# Patient Record
Sex: FEMALE | Race: WHITE | Employment: OTHER | URBAN - METROPOLITAN AREA
[De-identification: names, ages, dates, MRNs, and addresses within clinical notes are randomized per-mention and may not be internally consistent; named-entity substitution may affect disease eponyms.]

---

## 2017-06-15 ENCOUNTER — HOSPITAL ENCOUNTER (OUTPATIENT)
Dept: GENERAL RADIOLOGY | Age: 72
Discharge: HOME | End: 2017-06-15

## 2017-06-15 LAB
A/G RATIO: 1 (ref 1.1–2.2)
ALBUMIN SERPL-MCNC: 4.1 G/DL (ref 3.4–5)
ALP BLD-CCNC: 85 U/L (ref 40–129)
ALT SERPL-CCNC: 14 U/L (ref 10–40)
ANION GAP SERPL CALCULATED.3IONS-SCNC: 19 MMOL/L (ref 3–16)
AST SERPL-CCNC: 19 U/L (ref 15–37)
BILIRUB SERPL-MCNC: 0.3 MG/DL (ref 0–1)
BILIRUBIN URINE: NEGATIVE
BLOOD, URINE: ABNORMAL
BUN BLDV-MCNC: 34 MG/DL (ref 7–20)
CALCIUM SERPL-MCNC: 9.8 MG/DL (ref 8.3–10.6)
CHLORIDE BLD-SCNC: 103 MMOL/L (ref 99–110)
CHOLESTEROL, TOTAL: 204 MG/DL (ref 0–199)
CLARITY: ABNORMAL
CO2: 19 MMOL/L (ref 21–32)
COLOR: YELLOW
CREAT SERPL-MCNC: 1.5 MG/DL (ref 0.6–1.2)
CREATININE URINE: 78.9 MG/DL (ref 28–259)
GFR AFRICAN AMERICAN: 41
GFR NON-AFRICAN AMERICAN: 34
GLOBULIN: 4.2 G/DL
GLUCOSE BLD-MCNC: 159 MG/DL (ref 70–99)
GLUCOSE URINE: NEGATIVE MG/DL
HDLC SERPL-MCNC: 51 MG/DL (ref 40–60)
KETONES, URINE: NEGATIVE MG/DL
LDL CHOLESTEROL CALCULATED: 104 MG/DL
LEUKOCYTE ESTERASE, URINE: ABNORMAL
MICROALBUMIN UR-MCNC: 123.4 MG/DL
MICROALBUMIN/CREAT UR-RTO: 1564 MG/G (ref 0–30)
MICROSCOPIC EXAMINATION: YES
NITRITE, URINE: NEGATIVE
PH UA: 6
POTASSIUM SERPL-SCNC: 4.5 MMOL/L (ref 3.5–5.1)
PROTEIN UA: 100 MG/DL
RBC UA: ABNORMAL /HPF (ref 0–2)
SODIUM BLD-SCNC: 141 MMOL/L (ref 136–145)
SPECIFIC GRAVITY UA: 1.02
TOTAL PROTEIN: 8.3 G/DL (ref 6.4–8.2)
TRIGL SERPL-MCNC: 243 MG/DL (ref 0–150)
URINE TYPE: ABNORMAL
UROBILINOGEN, URINE: 0.2 E.U./DL
VLDLC SERPL CALC-MCNC: 49 MG/DL
WBC UA: >100 /HPF (ref 0–5)

## 2017-06-16 LAB
ESTIMATED AVERAGE GLUCOSE: 188.6 MG/DL
HBA1C MFR BLD: 8.2 %
URINE CULTURE, ROUTINE: NORMAL

## 2017-11-16 ENCOUNTER — HOSPITAL ENCOUNTER (OUTPATIENT)
Dept: GENERAL RADIOLOGY | Age: 72
Discharge: HOME | End: 2017-11-16

## 2017-11-16 LAB
A/G RATIO: 1 (ref 1.1–2.2)
ALBUMIN SERPL-MCNC: 4 G/DL (ref 3.4–5)
ALP BLD-CCNC: 85 U/L (ref 40–129)
ALT SERPL-CCNC: 14 U/L (ref 10–40)
ANION GAP SERPL CALCULATED.3IONS-SCNC: 20 MMOL/L (ref 3–16)
AST SERPL-CCNC: 20 U/L (ref 15–37)
BILIRUB SERPL-MCNC: <0.2 MG/DL (ref 0–1)
BUN BLDV-MCNC: 29 MG/DL (ref 7–20)
CALCIUM SERPL-MCNC: 9.8 MG/DL (ref 8.3–10.6)
CHLORIDE BLD-SCNC: 97 MMOL/L (ref 99–110)
CHOLESTEROL, FASTING: 224 MG/DL (ref 0–199)
CO2: 22 MMOL/L (ref 21–32)
CREAT SERPL-MCNC: 1.4 MG/DL (ref 0.6–1.2)
CREATININE URINE: 42.3 MG/DL (ref 28–259)
GFR AFRICAN AMERICAN: 45
GFR NON-AFRICAN AMERICAN: 37
GLOBULIN: 3.9 G/DL
GLUCOSE FASTING: 382 MG/DL (ref 70–99)
HDLC SERPL-MCNC: 65 MG/DL (ref 40–60)
LDL CHOLESTEROL CALCULATED: 118 MG/DL
MICROALBUMIN UR-MCNC: 172.8 MG/DL
MICROALBUMIN/CREAT UR-RTO: 4085.1 MG/G (ref 0–30)
POTASSIUM SERPL-SCNC: 5.1 MMOL/L (ref 3.5–5.1)
SODIUM BLD-SCNC: 139 MMOL/L (ref 136–145)
TOTAL PROTEIN: 7.9 G/DL (ref 6.4–8.2)
TRIGLYCERIDE, FASTING: 203 MG/DL (ref 0–150)
VLDLC SERPL CALC-MCNC: 41 MG/DL

## 2017-11-17 LAB
ESTIMATED AVERAGE GLUCOSE: 205.9 MG/DL
HBA1C MFR BLD: 8.8 %

## 2018-07-18 ENCOUNTER — HOSPITAL ENCOUNTER (OUTPATIENT)
Dept: MAMMOGRAPHY | Age: 73
Discharge: HOME | End: 2018-07-18
Payer: MEDICARE

## 2018-07-18 ENCOUNTER — HOSPITAL ENCOUNTER (OUTPATIENT)
Age: 73
Discharge: HOME | End: 2018-07-18
Payer: MEDICARE

## 2018-07-18 DIAGNOSIS — Z12.31 ENCOUNTER FOR SCREENING MAMMOGRAM FOR BREAST CANCER: ICD-10-CM

## 2018-07-18 PROCEDURE — 77067 SCR MAMMO BI INCL CAD: CPT

## 2018-09-11 ENCOUNTER — HOSPITAL ENCOUNTER (OUTPATIENT)
Age: 73
Discharge: HOME | End: 2018-09-11
Payer: MEDICARE

## 2018-09-11 LAB
A/G RATIO: 0.8 (ref 1.1–2.2)
ALBUMIN SERPL-MCNC: 3.3 G/DL (ref 3.4–5)
ALP BLD-CCNC: 88 U/L (ref 40–129)
ALT SERPL-CCNC: 15 U/L (ref 10–40)
ANION GAP SERPL CALCULATED.3IONS-SCNC: 14 MMOL/L (ref 3–16)
AST SERPL-CCNC: 20 U/L (ref 15–37)
BASOPHILS ABSOLUTE: 0.2 K/UL (ref 0–0.2)
BASOPHILS RELATIVE PERCENT: 1.8 %
BILIRUB SERPL-MCNC: <0.2 MG/DL (ref 0–1)
BILIRUBIN URINE: NEGATIVE
BLOOD, URINE: ABNORMAL
BUN BLDV-MCNC: 24 MG/DL (ref 7–20)
CALCIUM SERPL-MCNC: 9.6 MG/DL (ref 8.3–10.6)
CHLORIDE BLD-SCNC: 106 MMOL/L (ref 99–110)
CLARITY: CLEAR
CO2: 24 MMOL/L (ref 21–32)
COLOR: YELLOW
CREAT SERPL-MCNC: 1.6 MG/DL (ref 0.6–1.2)
EOSINOPHILS ABSOLUTE: 0.2 K/UL (ref 0–0.6)
EOSINOPHILS RELATIVE PERCENT: 1.2 %
EPITHELIAL CELLS, UA: ABNORMAL /HPF
GFR AFRICAN AMERICAN: 38
GFR NON-AFRICAN AMERICAN: 32
GLOBULIN: 4 G/DL
GLUCOSE BLD-MCNC: 206 MG/DL (ref 70–99)
GLUCOSE URINE: 250 MG/DL
HCT VFR BLD CALC: 40.3 % (ref 36–48)
HEMOGLOBIN: 13.1 G/DL (ref 12–16)
KETONES, URINE: NEGATIVE MG/DL
LEUKOCYTE ESTERASE, URINE: NEGATIVE
LYMPHOCYTES ABSOLUTE: 4.4 K/UL (ref 1–5.1)
LYMPHOCYTES RELATIVE PERCENT: 33.7 %
MCH RBC QN AUTO: 30.4 PG (ref 26–34)
MCHC RBC AUTO-ENTMCNC: 32.4 G/DL (ref 31–36)
MCV RBC AUTO: 93.9 FL (ref 80–100)
MICROSCOPIC EXAMINATION: YES
MONOCYTES ABSOLUTE: 0.8 K/UL (ref 0–1.3)
MONOCYTES RELATIVE PERCENT: 6.5 %
NEUTROPHILS ABSOLUTE: 7.4 K/UL (ref 1.7–7.7)
NEUTROPHILS RELATIVE PERCENT: 56.8 %
NITRITE, URINE: NEGATIVE
PDW BLD-RTO: 14.1 % (ref 12.4–15.4)
PH UA: 6
PLATELET # BLD: 359 K/UL (ref 135–450)
PMV BLD AUTO: 8.7 FL (ref 5–10.5)
POTASSIUM SERPL-SCNC: 4.7 MMOL/L (ref 3.5–5.1)
PROTEIN UA: 100 MG/DL
RBC # BLD: 4.29 M/UL (ref 4–5.2)
RBC UA: ABNORMAL /HPF (ref 0–2)
SODIUM BLD-SCNC: 144 MMOL/L (ref 136–145)
SPECIFIC GRAVITY UA: 1.02
TOTAL PROTEIN: 7.3 G/DL (ref 6.4–8.2)
URINE TYPE: ABNORMAL
UROBILINOGEN, URINE: 0.2 E.U./DL
WBC # BLD: 13 K/UL (ref 4–11)
WBC UA: ABNORMAL /HPF (ref 0–5)

## 2018-09-11 PROCEDURE — 83036 HEMOGLOBIN GLYCOSYLATED A1C: CPT

## 2018-09-11 PROCEDURE — 36415 COLL VENOUS BLD VENIPUNCTURE: CPT

## 2018-09-11 PROCEDURE — 85025 COMPLETE CBC W/AUTO DIFF WBC: CPT

## 2018-09-11 PROCEDURE — 80053 COMPREHEN METABOLIC PANEL: CPT

## 2018-09-11 PROCEDURE — 87086 URINE CULTURE/COLONY COUNT: CPT

## 2018-09-11 PROCEDURE — 81001 URINALYSIS AUTO W/SCOPE: CPT

## 2018-09-12 LAB
ESTIMATED AVERAGE GLUCOSE: 214.5 MG/DL
HBA1C MFR BLD: 9.1 %
ORGANISM: ABNORMAL
URINE CULTURE, ROUTINE: ABNORMAL
URINE CULTURE, ROUTINE: ABNORMAL

## 2018-10-01 ENCOUNTER — HOSPITAL ENCOUNTER (OUTPATIENT)
Age: 73
Discharge: HOME | End: 2018-10-01
Payer: MEDICARE

## 2018-10-01 LAB
A/G RATIO: 0.9 (ref 1.1–2.2)
ALBUMIN SERPL-MCNC: 3.6 G/DL (ref 3.4–5)
ALP BLD-CCNC: 75 U/L (ref 40–129)
ALT SERPL-CCNC: 13 U/L (ref 10–40)
ANION GAP SERPL CALCULATED.3IONS-SCNC: 17 MMOL/L (ref 3–16)
AST SERPL-CCNC: 28 U/L (ref 15–37)
BASOPHILS ABSOLUTE: 0.1 K/UL (ref 0–0.2)
BASOPHILS RELATIVE PERCENT: 0.6 %
BILIRUB SERPL-MCNC: 0.3 MG/DL (ref 0–1)
BUN BLDV-MCNC: 31 MG/DL (ref 7–20)
CALCIUM SERPL-MCNC: 9.4 MG/DL (ref 8.3–10.6)
CHLORIDE BLD-SCNC: 105 MMOL/L (ref 99–110)
CO2: 21 MMOL/L (ref 21–32)
CREAT SERPL-MCNC: 1.6 MG/DL (ref 0.6–1.2)
EOSINOPHILS ABSOLUTE: 0.1 K/UL (ref 0–0.6)
EOSINOPHILS RELATIVE PERCENT: 1.1 %
GFR AFRICAN AMERICAN: 38
GFR NON-AFRICAN AMERICAN: 32
GLOBULIN: 3.8 G/DL
GLUCOSE BLD-MCNC: 188 MG/DL (ref 70–99)
HCT VFR BLD CALC: 41.1 % (ref 36–48)
HEMOGLOBIN: 13.1 G/DL (ref 12–16)
LYMPHOCYTES ABSOLUTE: 4.1 K/UL (ref 1–5.1)
LYMPHOCYTES RELATIVE PERCENT: 39 %
MCH RBC QN AUTO: 29.8 PG (ref 26–34)
MCHC RBC AUTO-ENTMCNC: 31.8 G/DL (ref 31–36)
MCV RBC AUTO: 93.9 FL (ref 80–100)
MONOCYTES ABSOLUTE: 0.6 K/UL (ref 0–1.3)
MONOCYTES RELATIVE PERCENT: 5.7 %
NEUTROPHILS ABSOLUTE: 5.7 K/UL (ref 1.7–7.7)
NEUTROPHILS RELATIVE PERCENT: 53.6 %
PDW BLD-RTO: 14.3 % (ref 12.4–15.4)
PLATELET # BLD: 388 K/UL (ref 135–450)
PMV BLD AUTO: 9.1 FL (ref 5–10.5)
POTASSIUM SERPL-SCNC: 5.6 MMOL/L (ref 3.5–5.1)
RBC # BLD: 4.38 M/UL (ref 4–5.2)
SODIUM BLD-SCNC: 143 MMOL/L (ref 136–145)
TOTAL PROTEIN: 7.4 G/DL (ref 6.4–8.2)
WBC # BLD: 10.6 K/UL (ref 4–11)

## 2018-10-01 PROCEDURE — 36415 COLL VENOUS BLD VENIPUNCTURE: CPT

## 2018-10-01 PROCEDURE — 80053 COMPREHEN METABOLIC PANEL: CPT

## 2018-10-01 PROCEDURE — 85025 COMPLETE CBC W/AUTO DIFF WBC: CPT

## 2018-10-08 ENCOUNTER — HOSPITAL ENCOUNTER (OUTPATIENT)
Age: 73
Discharge: HOME | End: 2018-10-08
Payer: MEDICARE

## 2018-10-08 LAB
ANION GAP SERPL CALCULATED.3IONS-SCNC: 16 MMOL/L (ref 3–16)
BUN BLDV-MCNC: 22 MG/DL (ref 7–20)
CALCIUM SERPL-MCNC: 9.4 MG/DL (ref 8.3–10.6)
CHLORIDE BLD-SCNC: 106 MMOL/L (ref 99–110)
CO2: 23 MMOL/L (ref 21–32)
CREAT SERPL-MCNC: 1.6 MG/DL (ref 0.6–1.2)
GFR AFRICAN AMERICAN: 38
GFR NON-AFRICAN AMERICAN: 32
GLUCOSE BLD-MCNC: 168 MG/DL (ref 70–99)
POTASSIUM SERPL-SCNC: 4.9 MMOL/L (ref 3.5–5.1)
SODIUM BLD-SCNC: 145 MMOL/L (ref 136–145)

## 2018-10-08 PROCEDURE — 80048 BASIC METABOLIC PNL TOTAL CA: CPT

## 2018-10-08 PROCEDURE — 36415 COLL VENOUS BLD VENIPUNCTURE: CPT

## 2018-10-19 ENCOUNTER — HOSPITAL ENCOUNTER (OUTPATIENT)
Age: 73
Discharge: HOME | End: 2018-10-19
Payer: MEDICARE

## 2018-10-19 LAB
ANION GAP SERPL CALCULATED.3IONS-SCNC: 17 MMOL/L (ref 3–16)
BASOPHILS ABSOLUTE: 0.1 K/UL (ref 0–0.2)
BASOPHILS RELATIVE PERCENT: 0.7 %
BUN BLDV-MCNC: 29 MG/DL (ref 7–20)
CALCIUM SERPL-MCNC: 9.1 MG/DL (ref 8.3–10.6)
CHLORIDE BLD-SCNC: 105 MMOL/L (ref 99–110)
CO2: 21 MMOL/L (ref 21–32)
CREAT SERPL-MCNC: 1.6 MG/DL (ref 0.6–1.2)
EOSINOPHILS ABSOLUTE: 0.2 K/UL (ref 0–0.6)
EOSINOPHILS RELATIVE PERCENT: 2 %
GFR AFRICAN AMERICAN: 38
GFR NON-AFRICAN AMERICAN: 32
GLUCOSE BLD-MCNC: 195 MG/DL (ref 70–99)
HCT VFR BLD CALC: 40.6 % (ref 36–48)
HEMOGLOBIN: 13.1 G/DL (ref 12–16)
LYMPHOCYTES ABSOLUTE: 4.1 K/UL (ref 1–5.1)
LYMPHOCYTES RELATIVE PERCENT: 40.9 %
MCH RBC QN AUTO: 29.7 PG (ref 26–34)
MCHC RBC AUTO-ENTMCNC: 32.3 G/DL (ref 31–36)
MCV RBC AUTO: 92 FL (ref 80–100)
MONOCYTES ABSOLUTE: 0.8 K/UL (ref 0–1.3)
MONOCYTES RELATIVE PERCENT: 7.8 %
NEUTROPHILS ABSOLUTE: 4.8 K/UL (ref 1.7–7.7)
NEUTROPHILS RELATIVE PERCENT: 48.6 %
PDW BLD-RTO: 13.7 % (ref 12.4–15.4)
PLATELET # BLD: 402 K/UL (ref 135–450)
PMV BLD AUTO: 9 FL (ref 5–10.5)
POTASSIUM SERPL-SCNC: 4.5 MMOL/L (ref 3.5–5.1)
RBC # BLD: 4.41 M/UL (ref 4–5.2)
SODIUM BLD-SCNC: 143 MMOL/L (ref 136–145)
WBC # BLD: 9.9 K/UL (ref 4–11)

## 2018-10-19 PROCEDURE — 36415 COLL VENOUS BLD VENIPUNCTURE: CPT

## 2018-10-19 PROCEDURE — 85025 COMPLETE CBC W/AUTO DIFF WBC: CPT

## 2018-10-19 PROCEDURE — 83036 HEMOGLOBIN GLYCOSYLATED A1C: CPT

## 2018-10-19 PROCEDURE — 80048 BASIC METABOLIC PNL TOTAL CA: CPT

## 2018-10-20 LAB
ESTIMATED AVERAGE GLUCOSE: 205.9 MG/DL
HBA1C MFR BLD: 8.8 %

## 2018-11-05 ENCOUNTER — HOSPITAL ENCOUNTER (OUTPATIENT)
Age: 73
Discharge: HOME | End: 2018-11-05
Payer: MEDICARE

## 2018-11-05 LAB
ANION GAP SERPL CALCULATED.3IONS-SCNC: 16 MMOL/L (ref 3–16)
BUN BLDV-MCNC: 22 MG/DL (ref 7–20)
CALCIUM SERPL-MCNC: 9.1 MG/DL (ref 8.3–10.6)
CHLORIDE BLD-SCNC: 105 MMOL/L (ref 99–110)
CO2: 23 MMOL/L (ref 21–32)
CREAT SERPL-MCNC: 1.7 MG/DL (ref 0.6–1.2)
GFR AFRICAN AMERICAN: 36
GFR NON-AFRICAN AMERICAN: 29
GLUCOSE BLD-MCNC: 182 MG/DL (ref 70–99)
POTASSIUM SERPL-SCNC: 4.1 MMOL/L (ref 3.5–5.1)
SODIUM BLD-SCNC: 144 MMOL/L (ref 136–145)

## 2018-11-05 PROCEDURE — 36415 COLL VENOUS BLD VENIPUNCTURE: CPT

## 2018-11-05 PROCEDURE — 80048 BASIC METABOLIC PNL TOTAL CA: CPT

## 2019-02-28 ENCOUNTER — HOSPITAL ENCOUNTER (OUTPATIENT)
Age: 74
Discharge: HOME | End: 2019-02-28
Payer: MEDICARE

## 2019-02-28 LAB
A/G RATIO: 1 (ref 1.1–2.2)
ALBUMIN SERPL-MCNC: 3.4 G/DL (ref 3.4–5)
ALP BLD-CCNC: 74 U/L (ref 40–129)
ALT SERPL-CCNC: 15 U/L (ref 10–40)
ANION GAP SERPL CALCULATED.3IONS-SCNC: 16 MMOL/L (ref 3–16)
AST SERPL-CCNC: 22 U/L (ref 15–37)
BILIRUB SERPL-MCNC: <0.2 MG/DL (ref 0–1)
BUN BLDV-MCNC: 25 MG/DL (ref 7–20)
CALCIUM SERPL-MCNC: 8.9 MG/DL (ref 8.3–10.6)
CHLORIDE BLD-SCNC: 106 MMOL/L (ref 99–110)
CHOLESTEROL, FASTING: 250 MG/DL (ref 0–199)
CO2: 21 MMOL/L (ref 21–32)
CREAT SERPL-MCNC: 1.8 MG/DL (ref 0.6–1.2)
GFR AFRICAN AMERICAN: 33
GFR NON-AFRICAN AMERICAN: 28
GLOBULIN: 3.4 G/DL
GLUCOSE FASTING: 148 MG/DL (ref 70–99)
HDLC SERPL-MCNC: 44 MG/DL (ref 40–60)
LDL CHOLESTEROL CALCULATED: ABNORMAL MG/DL
LDL CHOLESTEROL DIRECT: 182 MG/DL
POTASSIUM SERPL-SCNC: 4.6 MMOL/L (ref 3.5–5.1)
SODIUM BLD-SCNC: 143 MMOL/L (ref 136–145)
TOTAL PROTEIN: 6.8 G/DL (ref 6.4–8.2)
TRIGLYCERIDE, FASTING: 312 MG/DL (ref 0–150)
VLDLC SERPL CALC-MCNC: ABNORMAL MG/DL

## 2019-02-28 PROCEDURE — 36415 COLL VENOUS BLD VENIPUNCTURE: CPT

## 2019-02-28 PROCEDURE — 80053 COMPREHEN METABOLIC PANEL: CPT

## 2019-02-28 PROCEDURE — 83036 HEMOGLOBIN GLYCOSYLATED A1C: CPT

## 2019-02-28 PROCEDURE — 80061 LIPID PANEL: CPT

## 2019-03-01 LAB
ESTIMATED AVERAGE GLUCOSE: 188.6 MG/DL
HBA1C MFR BLD: 8.2 %

## 2019-03-06 ENCOUNTER — HOSPITAL ENCOUNTER (OUTPATIENT)
Age: 74
Discharge: HOME | End: 2019-03-06
Payer: MEDICARE

## 2019-03-06 LAB
BACTERIA: ABNORMAL /HPF
BILIRUBIN URINE: NEGATIVE
BLOOD, URINE: ABNORMAL
CASTS: ABNORMAL /LPF
CLARITY: ABNORMAL
COLOR: YELLOW
EPITHELIAL CELLS, UA: ABNORMAL /HPF
GLUCOSE URINE: 100 MG/DL
KETONES, URINE: NEGATIVE MG/DL
LEUKOCYTE ESTERASE, URINE: NEGATIVE
MICROSCOPIC EXAMINATION: YES
NITRITE, URINE: NEGATIVE
PH UA: 6 (ref 5–8)
PROTEIN UA: 100 MG/DL
RBC UA: ABNORMAL /HPF (ref 0–2)
SPECIFIC GRAVITY UA: 1.02 (ref 1–1.03)
URINE TYPE: ABNORMAL
UROBILINOGEN, URINE: 0.2 E.U./DL
WBC UA: ABNORMAL /HPF (ref 0–5)

## 2019-03-06 PROCEDURE — 81001 URINALYSIS AUTO W/SCOPE: CPT

## 2019-03-06 PROCEDURE — 87086 URINE CULTURE/COLONY COUNT: CPT

## 2019-03-07 LAB — URINE CULTURE, ROUTINE: NORMAL

## 2019-04-22 ENCOUNTER — HOSPITAL ENCOUNTER (OUTPATIENT)
Age: 74
Discharge: HOME | End: 2019-04-22
Payer: MEDICARE

## 2019-04-22 LAB
A/G RATIO: 0.9 (ref 1.1–2.2)
ALBUMIN SERPL-MCNC: 3.3 G/DL (ref 3.4–5)
ALP BLD-CCNC: 63 U/L (ref 40–129)
ALT SERPL-CCNC: 12 U/L (ref 10–40)
ANION GAP SERPL CALCULATED.3IONS-SCNC: 14 MMOL/L (ref 3–16)
AST SERPL-CCNC: 21 U/L (ref 15–37)
BACTERIA: ABNORMAL /HPF
BASOPHILS ABSOLUTE: 0.1 K/UL (ref 0–0.2)
BASOPHILS RELATIVE PERCENT: 0.9 %
BILIRUB SERPL-MCNC: <0.2 MG/DL (ref 0–1)
BILIRUBIN URINE: NEGATIVE
BLOOD, URINE: NEGATIVE
BUN BLDV-MCNC: 24 MG/DL (ref 7–20)
CALCIUM SERPL-MCNC: 9.3 MG/DL (ref 8.3–10.6)
CHLORIDE BLD-SCNC: 106 MMOL/L (ref 99–110)
CHOLESTEROL, FASTING: 132 MG/DL (ref 0–199)
CLARITY: ABNORMAL
CO2: 24 MMOL/L (ref 21–32)
COLOR: YELLOW
CREAT SERPL-MCNC: 1.8 MG/DL (ref 0.6–1.2)
EOSINOPHILS ABSOLUTE: 0.2 K/UL (ref 0–0.6)
EOSINOPHILS RELATIVE PERCENT: 1.8 %
EPITHELIAL CELLS, UA: ABNORMAL /HPF
GFR AFRICAN AMERICAN: 33
GFR NON-AFRICAN AMERICAN: 28
GLOBULIN: 3.8 G/DL
GLUCOSE FASTING: 152 MG/DL (ref 70–99)
GLUCOSE URINE: 100 MG/DL
HCT VFR BLD CALC: 35.8 % (ref 36–48)
HDLC SERPL-MCNC: 46 MG/DL (ref 40–60)
HEMOGLOBIN: 11.6 G/DL (ref 12–16)
KETONES, URINE: NEGATIVE MG/DL
LDL CHOLESTEROL CALCULATED: 47 MG/DL
LEUKOCYTE ESTERASE, URINE: NEGATIVE
LYMPHOCYTES ABSOLUTE: 4.7 K/UL (ref 1–5.1)
LYMPHOCYTES RELATIVE PERCENT: 37.9 %
MCH RBC QN AUTO: 29.3 PG (ref 26–34)
MCHC RBC AUTO-ENTMCNC: 32.5 G/DL (ref 31–36)
MCV RBC AUTO: 90.1 FL (ref 80–100)
MICROSCOPIC EXAMINATION: YES
MONOCYTES ABSOLUTE: 1 K/UL (ref 0–1.3)
MONOCYTES RELATIVE PERCENT: 8.1 %
NEUTROPHILS ABSOLUTE: 6.4 K/UL (ref 1.7–7.7)
NEUTROPHILS RELATIVE PERCENT: 51.3 %
NITRITE, URINE: NEGATIVE
PDW BLD-RTO: 15.2 % (ref 12.4–15.4)
PH UA: 6 (ref 5–8)
PLATELET # BLD: 322 K/UL (ref 135–450)
PMV BLD AUTO: 9 FL (ref 5–10.5)
POTASSIUM SERPL-SCNC: 4.7 MMOL/L (ref 3.5–5.1)
PRO-BNP: 915 PG/ML (ref 0–124)
PROTEIN UA: >=300 MG/DL
RBC # BLD: 3.97 M/UL (ref 4–5.2)
RBC UA: ABNORMAL /HPF (ref 0–2)
SODIUM BLD-SCNC: 144 MMOL/L (ref 136–145)
SPECIFIC GRAVITY UA: 1.02 (ref 1–1.03)
T3 FREE: 2.6 PG/ML (ref 2.3–4.2)
THYROID PEROXIDASE (TPO) ABS: 20 IU/ML
TOTAL PROTEIN: 7.1 G/DL (ref 6.4–8.2)
TRIGLYCERIDE, FASTING: 196 MG/DL (ref 0–150)
TSH REFLEX: 3.21 UIU/ML (ref 0.27–4.2)
URINE TYPE: ABNORMAL
UROBILINOGEN, URINE: 0.2 E.U./DL
VLDLC SERPL CALC-MCNC: 39 MG/DL
WBC # BLD: 12.4 K/UL (ref 4–11)
WBC UA: ABNORMAL /HPF (ref 0–5)

## 2019-04-22 PROCEDURE — 84443 ASSAY THYROID STIM HORMONE: CPT

## 2019-04-22 PROCEDURE — 83036 HEMOGLOBIN GLYCOSYLATED A1C: CPT

## 2019-04-22 PROCEDURE — 83880 ASSAY OF NATRIURETIC PEPTIDE: CPT

## 2019-04-22 PROCEDURE — 86376 MICROSOMAL ANTIBODY EACH: CPT

## 2019-04-22 PROCEDURE — 87086 URINE CULTURE/COLONY COUNT: CPT

## 2019-04-22 PROCEDURE — 82570 ASSAY OF URINE CREATININE: CPT

## 2019-04-22 PROCEDURE — 80061 LIPID PANEL: CPT

## 2019-04-22 PROCEDURE — 82043 UR ALBUMIN QUANTITATIVE: CPT

## 2019-04-22 PROCEDURE — 80053 COMPREHEN METABOLIC PANEL: CPT

## 2019-04-22 PROCEDURE — 81001 URINALYSIS AUTO W/SCOPE: CPT

## 2019-04-22 PROCEDURE — 36415 COLL VENOUS BLD VENIPUNCTURE: CPT

## 2019-04-22 PROCEDURE — 84481 FREE ASSAY (FT-3): CPT

## 2019-04-22 PROCEDURE — 85025 COMPLETE CBC W/AUTO DIFF WBC: CPT

## 2019-04-23 LAB
CREATININE URINE: 88.4 MG/DL (ref 28–259)
ESTIMATED AVERAGE GLUCOSE: 205.9 MG/DL
HBA1C MFR BLD: 8.8 %
MICROALBUMIN UR-MCNC: 337.3 MG/DL
MICROALBUMIN/CREAT UR-RTO: 3815.6 MG/G (ref 0–30)

## 2019-04-24 LAB — URINE CULTURE, ROUTINE: NORMAL

## 2019-06-05 ENCOUNTER — TELEPHONE (OUTPATIENT)
Dept: SURGERY | Age: 74
End: 2019-06-05

## 2019-06-07 NOTE — PROGRESS NOTES
[facility]) Surgical Oncology  [facility]    HPI: Dear Dr. Aishwarya Copeland, Thank you for referring Ms. Smith who is here today with her grand chilrtierney for management of adenocarcinoma of the liver. The mass was first noticed in September of 2018 when she was admitted to the hospital for a UTI. At that time, a mass on liver noted thought to be hematoma. Patient recently developed leg swellings and had new testing done. Repeat imaging in April of this year show's a slight increase in size. Mass measures 11.4 x 7.9 x 7 cm previously 10.2 x 7 x 7 cm. A biopsy of the mass confirmed adenocarcinoma. Denies any recent nausea, vomiting, abdominal pain. No blood in stool. Endorses diarrhea but states this is not new. Recent EGD/Colonoscopy. 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 Her Mother had uterine cancer in her [de-identified] and her grandmother had some type of abdominal cancer. Also in her [de-identified]. Past Medical History:   Diagnosis Date    Cancer ([address]     Chronic back pain     Chronic kidney disease     GERD (gastroesophageal reflux disease)     Headache     Hyperlipidemia     Hypertension     Irritable bowel syndrome     Neuropathy     Obesity     Osteoarthritis     Type 2 diabetes mellitus without complication (HCC)     Urinary incontinence      Past Surgical History:   Procedure Laterality Date    APPENDECTOMY      CHOLECYSTECTOMY      EYE SURGERY      HYSTERECTOMY, TOTAL ABDOMINAL      SKIN CANCER EXCISION      SPLENECTOMY      TONSILLECTOMY AND ADENOIDECTOMY      XR CALCANEUS HEEL 4 VWR COMPLETE       Social:   Social History     Tobacco Use    Smoking status: Former Smoker    Smokeless tobacco: Never Used   Substance Use Topics    Alcohol use: Yes    Drug use: Never     History reviewed. No pertinent family history.   Allergies: Oxycodone-acetaminophen  Current Outpatient Medications   Medication Sig Dispense Refill    amLODIPine-atorvastatatin (CADUET) 5-10 MG per tablet 5 mg      furosemide (LASIX) 40 MG tablet 20 mg      lisinopril (PRINIVIL;ZESTRIL) 2.5 MG tablet 2.5 mg      bisoprolol (ZEBETA) 5 MG tablet 5 mg      atorvastatin (LIPITOR) 40 MG tablet 40 mg      insulin lispro (HUMALOG) 100 UNIT/ML injection vial Inject 25 Units into the skin      TRESIBA FLEXTOUCH 200 UNIT/ML SOPN       Multiple Vitamins-Minerals (COMPLETE WOMENS) TABS Take 1 tablet by mouth      vitamin D (CHOLECALCIFEROL) 1000 UNIT TABS tablet Take 1,000 Units by mouth       No current facility-administered medications for this visit. Review of Systems: See HPI  All other systems reviewed and are negative. Vitals:    06/11/19 1109 06/11/19 1129   BP: (!) 181/85 (!) 152/80   Pulse: 72    Temp: 98.6 °F (37 °C)    TempSrc: Oral    SpO2: 97%    Weight: 262 lb (118.8 kg)    Height: 5' 5\" (1.651 m)      Wt Readings from Last 3 Encounters:   06/11/19 262 lb (118.8 kg)     Body mass index is 43.6 kg/m². Physical Exam:   Constitutional: She is oriented to person, place, and time. She appears well-developed and well-nourished. No distress. HENT: Moist mucus membranes  Head: Normocephalic and atraumatic. Eyes: EOM are normal. Pupils are equal, round, and no icterus. Neck: Normal range of motion. Neck supple. No thyromegaly present. Cardiovascular: Normal rate and regular rhythm by peripheral pulses. Pulmonary/Chest: No respiratory distress. Bilateral symmetrical chest rise. Abdominal: Soft. She exhibits no distension and no mass. There is no hepatosplenomegaly. No tenderness. Incisional scar present. Extremities: She has bilateral lower extremity edema. Lymphadenopathy: She has no bilateral cervical adenopathy. Neurological: Grossly intact motor and sensory exam.   Skin: Skin is warm and dry. Psychiatric: She has a normal mood and affect. Appropriate thought process and judgement capacity.     5/1/19:    AFP: <2.2  CEA: 1.37    Creatinine - 4/22 - 1.8    CT ABD/Pelvis

## 2019-06-11 ENCOUNTER — OFFICE VISIT (OUTPATIENT)
Dept: SURGERY | Age: 74
End: 2019-06-11
Payer: MEDICARE

## 2019-06-11 VITALS
BODY MASS INDEX: 43.65 KG/M2 | HEART RATE: 72 BPM | SYSTOLIC BLOOD PRESSURE: 152 MMHG | TEMPERATURE: 98.6 F | OXYGEN SATURATION: 97 % | WEIGHT: 262 LBS | HEIGHT: 65 IN | DIASTOLIC BLOOD PRESSURE: 80 MMHG

## 2019-06-11 DIAGNOSIS — C22.9 HEPATIC ADENOCARCINOMA (HCC): Primary | ICD-10-CM

## 2019-06-11 DIAGNOSIS — N18.9 CHRONIC RENAL FAILURE, UNSPECIFIED CKD STAGE: ICD-10-CM

## 2019-06-11 DIAGNOSIS — R60.0 BILATERAL LEG EDEMA: ICD-10-CM

## 2019-06-11 PROCEDURE — G8427 DOCREV CUR MEDS BY ELIG CLIN: HCPCS | Performed by: SURGERY

## 2019-06-11 PROCEDURE — 1123F ACP DISCUSS/DSCN MKR DOCD: CPT | Performed by: SURGERY

## 2019-06-11 PROCEDURE — 1090F PRES/ABSN URINE INCON ASSESS: CPT | Performed by: SURGERY

## 2019-06-11 PROCEDURE — 4040F PNEUMOC VAC/ADMIN/RCVD: CPT | Performed by: SURGERY

## 2019-06-11 PROCEDURE — 3017F COLORECTAL CA SCREEN DOC REV: CPT | Performed by: SURGERY

## 2019-06-11 PROCEDURE — G8400 PT W/DXA NO RESULTS DOC: HCPCS | Performed by: SURGERY

## 2019-06-11 PROCEDURE — 1036F TOBACCO NON-USER: CPT | Performed by: SURGERY

## 2019-06-11 PROCEDURE — 99205 OFFICE O/P NEW HI 60 MIN: CPT | Performed by: SURGERY

## 2019-06-11 PROCEDURE — G8417 CALC BMI ABV UP PARAM F/U: HCPCS | Performed by: SURGERY

## 2019-06-11 RX ORDER — AMLODIPINE BESYLATE AND ATORVASTATIN CALCIUM 5; 10 MG/1; MG/1
5 TABLET, FILM COATED ORAL
COMMUNITY
End: 2019-06-20 | Stop reason: ALTCHOICE

## 2019-06-11 RX ORDER — BISOPROLOL FUMARATE 5 MG/1
5 TABLET ORAL
COMMUNITY
End: 2019-06-21 | Stop reason: SDUPTHER

## 2019-06-11 RX ORDER — FUROSEMIDE 40 MG/1
20 TABLET ORAL
COMMUNITY
End: 2019-06-20

## 2019-06-11 RX ORDER — INSULIN DEGLUDEC 200 U/ML
INJECTION, SOLUTION SUBCUTANEOUS
COMMUNITY
Start: 2019-04-13

## 2019-06-11 RX ORDER — ATORVASTATIN CALCIUM 40 MG/1
40 TABLET, FILM COATED ORAL
COMMUNITY
End: 2019-06-21 | Stop reason: SDUPTHER

## 2019-06-11 RX ORDER — LISINOPRIL 2.5 MG/1
2.5 TABLET ORAL
COMMUNITY

## 2019-06-12 DIAGNOSIS — C22.9 HEPATIC ADENOCARCINOMA (HCC): Primary | ICD-10-CM

## 2019-06-14 DIAGNOSIS — C22.9 HEPATIC ADENOCARCINOMA (HCC): Primary | ICD-10-CM

## 2019-06-14 PROBLEM — N18.9 CHRONIC RENAL FAILURE: Status: ACTIVE | Noted: 2019-06-14

## 2019-06-17 ENCOUNTER — TELEPHONE (OUTPATIENT)
Dept: SURGERY | Age: 74
End: 2019-06-17

## 2019-06-17 DIAGNOSIS — R60.0 BILATERAL LEG EDEMA: ICD-10-CM

## 2019-06-17 DIAGNOSIS — C22.9 HEPATIC ADENOCARCINOMA (HCC): Primary | ICD-10-CM

## 2019-06-17 DIAGNOSIS — N18.9 CHRONIC RENAL FAILURE, UNSPECIFIED CKD STAGE: ICD-10-CM

## 2019-06-18 ENCOUNTER — HOSPITAL ENCOUNTER (OUTPATIENT)
Dept: INTERVENTIONAL RADIOLOGY/VASCULAR | Age: 74
Discharge: HOME | End: 2019-06-18
Payer: MEDICARE

## 2019-06-18 NOTE — H&P
Interventional Radiology consultation for Y90 Therasphere Radioembolization    Ms Micheline Mueller is a 68year old female who was seen in consultation for possible radioembolization of liver cancer. The procedure was explained in detail, including potential risks/complications and alternatives. All of the patients questions were answered and concerns addressed. At the end of the consultation, the patient signed informed consent to proceed with Y90 Therasphere radioembolization of the liver. Cancer History  Hepatic metastasis c/w adenocarcinoma, likely GI origin    Cross sectional Imaging  reviewed        There are no contraindications to the proposed workup and treatment based on the patients medical/surgical history and laboratory values. Consultations  Radiation Oncology : Dr. Copma Adams  Oncologic Surgery : Dr. Jj Marquez    The above consultants and I agree that Y90 Radioembolization of the patients hepatic tumor burden is the best treatment at this time. Anticipated number of treatments: 1, right lobe  Please note that this may change after additional imaging and angiography. Pre-authorization will be obtained at this time, following which the patient will be scheduled for a dedicated CT angiogram of the abdomen, followed by a planning angiogram with possible coil embolization of arterial branches which require exclusion from the treatment field. At the time of the angiogram, technetium injection into the hepatic artery will be performed for assessment oflung shunting in order to avoid pulmonary toxicity of at the time of treatment. When this workup has been completed, the patient will be scheduled for Y90 radioembolization.     Any questions or concerns regarding this treatment plan should be directed to :  [facility] Inter[facility] Nurse [phone]  Dr. Shakir Reyes

## 2019-06-21 ENCOUNTER — TELEPHONE (OUTPATIENT)
Dept: SURGERY | Age: 74
End: 2019-06-21

## 2019-06-21 ENCOUNTER — OFFICE VISIT (OUTPATIENT)
Dept: CARDIOLOGY CLINIC | Age: 74
End: 2019-06-21
Payer: MEDICARE

## 2019-06-21 VITALS
DIASTOLIC BLOOD PRESSURE: 80 MMHG | BODY MASS INDEX: 43.63 KG/M2 | HEART RATE: 67 BPM | WEIGHT: 262.2 LBS | SYSTOLIC BLOOD PRESSURE: 130 MMHG

## 2019-06-21 DIAGNOSIS — I50.32 CHRONIC HEART FAILURE WITH PRESERVED EJECTION FRACTION (HCC): Primary | ICD-10-CM

## 2019-06-21 DIAGNOSIS — I10 ESSENTIAL HYPERTENSION: ICD-10-CM

## 2019-06-21 DIAGNOSIS — Z01.818 PRE-OP EVALUATION: ICD-10-CM

## 2019-06-21 DIAGNOSIS — E78.2 MIXED HYPERLIPIDEMIA: ICD-10-CM

## 2019-06-21 PROCEDURE — 1090F PRES/ABSN URINE INCON ASSESS: CPT | Performed by: INTERNAL MEDICINE

## 2019-06-21 PROCEDURE — G8417 CALC BMI ABV UP PARAM F/U: HCPCS | Performed by: INTERNAL MEDICINE

## 2019-06-21 PROCEDURE — G8427 DOCREV CUR MEDS BY ELIG CLIN: HCPCS | Performed by: INTERNAL MEDICINE

## 2019-06-21 PROCEDURE — 93000 ELECTROCARDIOGRAM COMPLETE: CPT | Performed by: INTERNAL MEDICINE

## 2019-06-21 PROCEDURE — 99203 OFFICE O/P NEW LOW 30 MIN: CPT | Performed by: INTERNAL MEDICINE

## 2019-06-21 RX ORDER — ATORVASTATIN CALCIUM 40 MG/1
40 TABLET, FILM COATED ORAL DAILY
Qty: 90 TABLET | Refills: 3 | OUTPATIENT
Start: 2019-06-21

## 2019-06-21 RX ORDER — BISOPROLOL FUMARATE 5 MG/1
5 TABLET ORAL DAILY
Qty: 90 TABLET | Refills: 3 | COMMUNITY
Start: 2019-06-21

## 2019-06-21 NOTE — TELEPHONE ENCOUNTER
Patient's daughter verbalized an understanding that PET is scheduled for 6/25/19 at 08:30. Water only 5 hours before, hold Humalog for 12 hours and glucose level must be <200.

## 2019-06-21 NOTE — PROGRESS NOTES
Cc: preop evaluation for radioablation of liver cancer, HFpEF, HTN    HPI:     Mrs Gayatri Haque is a 69 yo woman with h/o HTN, HLP, overweight, HFpEF, CKD 3 (follows with Dr. Jojo Babb) and newly (09/2018) diagnosed liver cancer. She will be having radioembolization of liver mass by Dr. Kae Branch soon. Patient denies any prior cardiac history. She reports mild dyspnea with exertion and has bilateral leg edema. She has noticed some weight gain recently. She denies any other congestive symptoms. She was seen by Dr. Jojo Babb yesterday and her amlodipine 5 was changed to prazosin 2 mg for HTN in view of edema. Also she started taking lasix 80 daily from 40 daily yesterday. Echo 05/2019: normal except for moderate LVH with diastolic I    Xenia 74/4861: negative. ECG 6/21/19: NSR, poor R wave progression. Histories     Past Medical History:   has a past medical history of Cancer ([address] Chronic back pain, Chronic kidney disease, GERD (gastroesophageal reflux disease), Headache, Hyperlipidemia, Hypertension, Irritable bowel syndrome, Neuropathy, Obesity, Osteoarthritis, Type 2 diabetes mellitus without complication ([address] and Urinary incontinence. Surgical History:   has a past surgical history that includes Appendectomy; Cholecystectomy; Splenectomy; Tonsillectomy and adenoidectomy; Hysterectomy, total abdominal; xr calcaneus heel 4 vwr complete; eye surgery; and Skin cancer excision. Social History:   reports that she has quit smoking. She has never used smokeless tobacco. She reports that she drinks alcohol. She reports that she does not use drugs. Family History:  No evidence for sudden cardiac death or premature CAD      Medications:     Home medications were reviewed and are listed below    Prior to Admission medications    Medication Sig Start Date End Date Taking?  Authorizing Provider   prazosin (MINIPRESS) 2 MG capsule Take 1 capsule by mouth nightly 6/20/19  Yes Pérez Acevedo MD   atorvastatin (LIPITOR) 10 MG tablet Take 1 tablet by mouth daily 6/20/19  Yes Shantel Mccloud MD   furosemide (LASIX) 40 MG tablet Take 2 tablets by mouth daily 6/20/19  Yes Shantel Mccloud MD   lisinopril (PRINIVIL;ZESTRIL) 2.5 MG tablet 2.5 mg   Yes Historical Provider, MD   bisoprolol (ZEBETA) 5 MG tablet 5 mg   Yes Historical Provider, MD   atorvastatin (LIPITOR) 40 MG tablet 40 mg   Yes Historical Provider, MD   insulin lispro (HUMALOG) 100 UNIT/ML injection vial Inject 25 Units into the skin   Yes Historical Provider, MD Franco Albarran 200 UNIT/ML SOPN  4/13/19  Yes Historical Provider, MD   Multiple Vitamins-Minerals (COMPLETE WOMENS) TABS Take 1 tablet by mouth   Yes Historical Provider, MD   vitamin D (CHOLECALCIFEROL) 1000 UNIT TABS tablet Take 1,000 Units by mouth   Yes Historical Provider, MD          Allergy:     Oxycodone-acetaminophen       Review of Systems:     All 12 point review of symptoms completed. Pertinent positives identified in the HPI, all other review of symptoms negative as below. CONSTITUTIONAL: No fatigue  SKIN: No rash or pruritis. EYES: No visual changes or diplopia. No scleral icterus. ENT: No Headaches, hearing loss or vertigo. No mouth sores or sore throat. CARDIOVASCULAR: No chest pain/chest pressure/chest discomfort. No palpitations. + edema. RESPIRATORY: + dyspnea. No cough or wheezing, no sputum production. GASTROINTESTINAL: No N/V/D. No abdominal pain, appetite loss, blood in stools. GENITOURINARY: No dysuria, trouble voiding, or hematuria. MUSCULOSKELETAL:  No gait disturbance, weakness or joint complaints. NEUROLOGICAL: No headache, diplopia, change in muscle strength, numbness or tingling. No change in gait, balance, coordination, mood, affect, memory, mentation, behavior. PSHYCH: No anxiety, loss of interest, change in sexual behavior, feelings of self-harm, or confusion. ENDOCRINE: No excessive thirst, fluid intake, or urination. No tremor.   HEMATOLOGIC: No abnormal bruising or bleeding. ALLERGY: No nasal congestion or hives.       Physical Examination:     Vitals:    06/21/19 0946   BP: 130/80   Pulse: 67   Weight: 262 lb 3.2 oz (118.9 kg)       Wt Readings from Last 3 Encounters:   06/21/19 262 lb 3.2 oz (118.9 kg)   06/20/19 259 lb (117.5 kg)   06/11/19 262 lb (118.8 kg)         General Appearance:  Alert, cooperative, no distress, appears stated age Appropriate weight   Head:  Normocephalic, without obvious abnormality, atraumatic   Eyes:  PERRL, conjunctiva/corneas clear EOM intact  Ears normal   Throat no lesions       Nose: Nares normal, no drainage or sinus tenderness   Throat: Lips, mucosa, and tongue normal   Neck: Supple, symmetrical, trachea midline, no adenopathy, thyroid: not enlarged, symmetric, no tenderness/mass/nodules, no carotid bruit       Lungs:   Clear to auscultation bilaterally, respirations unlabored   Chest Wall:  No tenderness or deformity   Heart:  Regular rhythm, rate is controlled, S1, S2 normal, there is no murmur, there is no rub or gallop, no jvd, 2+ bilateral lower extremity edema   Abdomen:   Soft, non-tender, bowel sounds active all four quadrants,  no masses, no organomegaly       Extremities: Extremities normal, atraumatic, no cyanosis   Pulses: 2+ and symmetric   Skin: Skin color, texture, turgor normal, no rashes or lesions   Pysch: Normal mood and affect   Neurologic: Normal gross motor and sensory exam.  Cranial nerves intact        Labs:     Lab Results   Component Value Date    WBC 12.4 (H) 04/22/2019    HGB 11.6 (L) 04/22/2019    HCT 35.8 (L) 04/22/2019    MCV 90.1 04/22/2019     04/22/2019     Lab Results   Component Value Date     04/22/2019    K 4.7 04/22/2019     04/22/2019    CO2 24 04/22/2019    BUN 24 (H) 04/22/2019    CREATININE 1.8 (H) 04/22/2019    GLUCOSE 182 (H) 11/05/2018    CALCIUM 9.3 04/22/2019    PROT 7.1 04/22/2019    LABALBU 3.3 (L) 04/22/2019    BILITOT <0.2 04/22/2019    ALKPHOS 63 04/22/2019    AST 21 04/22/2019    ALT 12 04/22/2019    LABGLOM 28 (A) 04/22/2019    GFRAA 33 (A) 04/22/2019    AGRATIO 0.9 (L) 04/22/2019    GLOB 3.8 04/22/2019         Lab Results   Component Value Date    CHOL 204 (H) 06/15/2017     Lab Results   Component Value Date    TRIG 243 (H) 06/15/2017     Lab Results   Component Value Date    HDL 46 04/22/2019    HDL 44 02/28/2019    HDL 65 (H) 11/16/2017     Lab Results   Component Value Date    LDLCALC 47 04/22/2019    [address] see below 02/28/2019    LDLCALC 118 (H) 11/16/2017     Lab Results   Component Value Date    LABVLDL 39 04/22/2019    LABVLDL see below 02/28/2019    LABVLDL 41 11/16/2017     No results found for: CHOLHDLRATIO    No results found for: INR, PROTIME    The 10-year ASCVD risk score (Dagmar Katz, et al., 2013) is: 23%    Values used to calculate the score:      Age: 68 years      Sex: Female      Is Non- : No      Diabetic: Yes      Tobacco smoker: No      Systolic Blood Pressure: 334 mmHg      Is BP treated: No      HDL Cholesterol: 46 mg/dL      Total Cholesterol: 132 mg/dL    Imaging:       Assessment / Plan:      Diagnosis Orders   1. Chronic heart failure with preserved ejection fraction ([address]     2. Pre-op evaluation  EKG 12 Lead   3. Essential hypertension     4. Mixed hyperlipidemia        1. Preoperative cardiovascular evaluation:     Patient is low risk for any perioperative CV complications and may proceed with surgery without any further cardiac testing. 2. Chronic HFpEF:   Patient has mild leg edema but overall appears euvolemic and hemo stable.     -Agree with changes by Dr. Yung Darling  -C/w lasix 80 bid  -Unable to start or increase ACEI/ARB/spironolactone or Entresto due to CKD    3. HTN:   BP is controlled. 4. HLP:   On statin. Return in about 3 months (around 9/21/2019).     I have personally reviewed the reports and images of labs, radiological studies, cardiac studies including ECG's and telemetry, current and old medical records. The note was completed using EMR. Every effort was made to ensure accuracy; however, inadvertent computerized transcription errors may be present. All questions and concerns were addressed to the patient/family. Alternatives to my treatment were discussed. I would like to thank you for providing me the opportunity to participate in the care of your patient. If you have any questions, please do not hesitate to contact me.      Josy Worthington MD, [facility], [address] [address]  Ph: [phone]  Fax: [phone]

## 2019-06-25 ENCOUNTER — TELEPHONE (OUTPATIENT)
Dept: SURGERY | Age: 74
End: 2019-06-25

## 2019-06-25 NOTE — TELEPHONE ENCOUNTER
The patients daughter is asking to speak to Camille Ames in regards to the PET scan that the patient had today. Please call.

## 2019-06-25 NOTE — TELEPHONE ENCOUNTER
Patients daughter states that patient was told that PET scan was open on the sides. She was not mentally prepared for PET scan due to it being enclosed and left crying hysterically. Daughter still wants to talk to Nery Lunsford.

## 2019-07-01 ENCOUNTER — TELEPHONE (OUTPATIENT)
Dept: SURGERY | Age: 74
End: 2019-07-01

## 2019-07-01 ENCOUNTER — TELEPHONE (OUTPATIENT)
Dept: CARDIAC CATH/INVASIVE PROCEDURES | Age: 74
End: 2019-07-01

## 2019-07-01 ENCOUNTER — SURG/PROC ORDERS (OUTPATIENT)
Dept: CARDIAC CATH/INVASIVE PROCEDURES | Age: 74
End: 2019-07-01

## 2019-07-01 DIAGNOSIS — C22.9 HEPATIC ADENOCARCINOMA (HCC): Primary | ICD-10-CM

## 2019-07-01 RX ORDER — SODIUM CHLORIDE 9 MG/ML
INJECTION, SOLUTION INTRAVENOUS CONTINUOUS
Status: CANCELLED | OUTPATIENT
Start: 2019-07-08

## 2019-07-08 ENCOUNTER — HOSPITAL ENCOUNTER (OUTPATIENT)
Dept: CT IMAGING | Age: 74
Discharge: HOME | End: 2019-07-08
Payer: MEDICARE

## 2019-07-08 ENCOUNTER — HOSPITAL ENCOUNTER (OUTPATIENT)
Dept: INTERVENTIONAL RADIOLOGY/VASCULAR | Age: 74
Discharge: HOME | End: 2019-07-08
Attending: RADIOLOGY | Admitting: RADIOLOGY
Payer: MEDICARE

## 2019-07-08 DIAGNOSIS — C22.9 HEPATIC ADENOCARCINOMA (HCC): ICD-10-CM

## 2019-07-08 LAB
A/G RATIO: 0.9 (ref 1.1–2.2)
ALBUMIN SERPL-MCNC: 3.5 G/DL (ref 3.4–5)
ALP BLD-CCNC: 77 U/L (ref 40–129)
ALT SERPL-CCNC: 18 U/L (ref 10–40)
ANION GAP SERPL CALCULATED.3IONS-SCNC: 13 MMOL/L (ref 3–16)
AST SERPL-CCNC: 30 U/L (ref 15–37)
BILIRUB SERPL-MCNC: <0.2 MG/DL (ref 0–1)
BUN BLDV-MCNC: 49 MG/DL (ref 7–20)
CALCIUM SERPL-MCNC: 9.7 MG/DL (ref 8.3–10.6)
CEA: 2.8 NG/ML (ref 0–5)
CHLORIDE BLD-SCNC: 101 MMOL/L (ref 99–110)
CO2: 26 MMOL/L (ref 21–32)
CREAT SERPL-MCNC: 2.2 MG/DL (ref 0.6–1.2)
GFR AFRICAN AMERICAN: 26
GFR NON-AFRICAN AMERICAN: 22
GLOBULIN: 4 G/DL
GLUCOSE BLD-MCNC: 216 MG/DL (ref 70–99)
HCT VFR BLD CALC: 37.7 % (ref 36–48)
HEMOGLOBIN: 12.3 G/DL (ref 12–16)
INR BLD: 1.02 (ref 0.86–1.14)
MCH RBC QN AUTO: 29.2 PG (ref 26–34)
MCHC RBC AUTO-ENTMCNC: 32.6 G/DL (ref 31–36)
MCV RBC AUTO: 89.8 FL (ref 80–100)
PDW BLD-RTO: 15 % (ref 12.4–15.4)
PLATELET # BLD: 393 K/UL (ref 135–450)
PMV BLD AUTO: 7.7 FL (ref 5–10.5)
POTASSIUM SERPL-SCNC: 4.2 MMOL/L (ref 3.5–5.1)
PROTHROMBIN TIME: 11.6 SEC (ref 9.8–13)
RBC # BLD: 4.2 M/UL (ref 4–5.2)
SODIUM BLD-SCNC: 140 MMOL/L (ref 136–145)
TOTAL PROTEIN: 7.5 G/DL (ref 6.4–8.2)
WBC # BLD: 11.4 K/UL (ref 4–11)

## 2019-07-08 PROCEDURE — 85610 PROTHROMBIN TIME: CPT

## 2019-07-08 PROCEDURE — 6360000004 HC RX CONTRAST MEDICATION: Performed by: RADIOLOGY

## 2019-07-08 PROCEDURE — 74170 CT ABD WO CNTRST FLWD CNTRST: CPT

## 2019-07-08 PROCEDURE — 85027 COMPLETE CBC AUTOMATED: CPT

## 2019-07-08 PROCEDURE — 80053 COMPREHEN METABOLIC PANEL: CPT

## 2019-07-08 PROCEDURE — 2580000003 HC RX 258: Performed by: RADIOLOGY

## 2019-07-08 PROCEDURE — 82378 CARCINOEMBRYONIC ANTIGEN: CPT

## 2019-07-08 RX ORDER — SODIUM CHLORIDE 9 MG/ML
INJECTION, SOLUTION INTRAVENOUS CONTINUOUS
Status: DISCONTINUED | OUTPATIENT
Start: 2019-07-08 | End: 2019-07-08 | Stop reason: HOSPADM

## 2019-07-08 RX ADMIN — IOPAMIDOL 65 ML: 755 INJECTION, SOLUTION INTRAVENOUS at 13:07

## 2019-07-08 RX ADMIN — SODIUM CHLORIDE: 9 INJECTION, SOLUTION INTRAVENOUS at 10:54

## 2019-07-15 ENCOUNTER — HOSPITAL ENCOUNTER (OUTPATIENT)
Dept: NUCLEAR MEDICINE | Age: 74
Discharge: HOME | End: 2019-07-15
Payer: MEDICARE

## 2019-07-15 ENCOUNTER — HOSPITAL ENCOUNTER (OUTPATIENT)
Dept: INTERVENTIONAL RADIOLOGY/VASCULAR | Age: 74
Discharge: HOME | End: 2019-07-15
Attending: RADIOLOGY | Admitting: RADIOLOGY
Payer: MEDICARE

## 2019-07-15 VITALS — HEIGHT: 65 IN | BODY MASS INDEX: 42.49 KG/M2 | TEMPERATURE: 97.4 F | WEIGHT: 255 LBS

## 2019-07-15 LAB
A/G RATIO: 0.9 (ref 1.1–2.2)
ALBUMIN SERPL-MCNC: 3.6 G/DL (ref 3.4–5)
ALP BLD-CCNC: 82 U/L (ref 40–129)
ALT SERPL-CCNC: 15 U/L (ref 10–40)
ANION GAP SERPL CALCULATED.3IONS-SCNC: 14 MMOL/L (ref 3–16)
AST SERPL-CCNC: 31 U/L (ref 15–37)
BILIRUB SERPL-MCNC: <0.2 MG/DL (ref 0–1)
BUN BLDV-MCNC: 52 MG/DL (ref 7–20)
CALCIUM SERPL-MCNC: 9.3 MG/DL (ref 8.3–10.6)
CHLORIDE BLD-SCNC: 101 MMOL/L (ref 99–110)
CO2: 26 MMOL/L (ref 21–32)
CREAT SERPL-MCNC: 2.3 MG/DL (ref 0.6–1.2)
GFR AFRICAN AMERICAN: 25
GFR NON-AFRICAN AMERICAN: 21
GLOBULIN: 4 G/DL
GLUCOSE BLD-MCNC: 187 MG/DL (ref 70–99)
HCT VFR BLD CALC: 37.8 % (ref 36–48)
HEMOGLOBIN: 12.2 G/DL (ref 12–16)
INR BLD: 1.03 (ref 0.86–1.14)
MCH RBC QN AUTO: 29.4 PG (ref 26–34)
MCHC RBC AUTO-ENTMCNC: 32.4 G/DL (ref 31–36)
MCV RBC AUTO: 90.9 FL (ref 80–100)
PDW BLD-RTO: 15.4 % (ref 12.4–15.4)
PLATELET # BLD: 348 K/UL (ref 135–450)
PMV BLD AUTO: 7.8 FL (ref 5–10.5)
POTASSIUM SERPL-SCNC: 4.8 MMOL/L (ref 3.5–5.1)
PROTHROMBIN TIME: 11.7 SEC (ref 9.8–13)
RBC # BLD: 4.16 M/UL (ref 4–5.2)
SODIUM BLD-SCNC: 141 MMOL/L (ref 136–145)
TOTAL PROTEIN: 7.6 G/DL (ref 6.4–8.2)
WBC # BLD: 13 K/UL (ref 4–11)

## 2019-07-15 PROCEDURE — 6360000004 HC RX CONTRAST MEDICATION: Performed by: RADIOLOGY

## 2019-07-15 PROCEDURE — 76377 3D RENDER W/INTRP POSTPROCES: CPT | Performed by: RADIOLOGY

## 2019-07-15 PROCEDURE — 6360000002 HC RX W HCPCS

## 2019-07-15 PROCEDURE — 3430000000 HC RX DIAGNOSTIC RADIOPHARMACEUTICAL: Performed by: RADIOLOGY

## 2019-07-15 PROCEDURE — C1894 INTRO/SHEATH, NON-LASER: HCPCS

## 2019-07-15 PROCEDURE — 99152 MOD SED SAME PHYS/QHP 5/>YRS: CPT | Performed by: RADIOLOGY

## 2019-07-15 PROCEDURE — 2709999900 HC NON-CHARGEABLE SUPPLY

## 2019-07-15 PROCEDURE — C1725 CATH, TRANSLUMIN NON-LASER: HCPCS

## 2019-07-15 PROCEDURE — A9540 TC99M MAA: HCPCS | Performed by: RADIOLOGY

## 2019-07-15 PROCEDURE — 78201 LIVER IMAGING STATIC ONLY: CPT

## 2019-07-15 PROCEDURE — 36248 INS CATH ABD/L-EXT ART ADDL: CPT | Performed by: RADIOLOGY

## 2019-07-15 PROCEDURE — C1769 GUIDE WIRE: HCPCS

## 2019-07-15 PROCEDURE — 75726 ARTERY X-RAYS ABDOMEN: CPT | Performed by: RADIOLOGY

## 2019-07-15 PROCEDURE — 85027 COMPLETE CBC AUTOMATED: CPT

## 2019-07-15 PROCEDURE — 2500000003 HC RX 250 WO HCPCS

## 2019-07-15 PROCEDURE — 2580000003 HC RX 258: Performed by: RADIOLOGY

## 2019-07-15 PROCEDURE — 6370000000 HC RX 637 (ALT 250 FOR IP): Performed by: RADIOLOGY

## 2019-07-15 PROCEDURE — A9520 TC99 TILMANOCEPT DIAG 0.5MCI: HCPCS | Performed by: RADIOLOGY

## 2019-07-15 PROCEDURE — 85610 PROTHROMBIN TIME: CPT

## 2019-07-15 PROCEDURE — 99153 MOD SED SAME PHYS/QHP EA: CPT | Performed by: RADIOLOGY

## 2019-07-15 PROCEDURE — C1760 CLOSURE DEV, VASC: HCPCS

## 2019-07-15 PROCEDURE — 80053 COMPREHEN METABOLIC PANEL: CPT

## 2019-07-15 PROCEDURE — 75774 ARTERY X-RAY EACH VESSEL: CPT | Performed by: RADIOLOGY

## 2019-07-15 PROCEDURE — 36247 INS CATH ABD/L-EXT ART 3RD: CPT | Performed by: RADIOLOGY

## 2019-07-15 RX ORDER — HYDROCODONE BITARTRATE AND ACETAMINOPHEN 5; 325 MG/1; MG/1
1 TABLET ORAL EVERY 6 HOURS PRN
COMMUNITY

## 2019-07-15 RX ORDER — SODIUM CHLORIDE 9 MG/ML
INJECTION, SOLUTION INTRAVENOUS CONTINUOUS
Status: DISCONTINUED | OUTPATIENT
Start: 2019-07-15 | End: 2019-07-16 | Stop reason: HOSPADM

## 2019-07-15 RX ORDER — HYDROCODONE BITARTRATE AND ACETAMINOPHEN 5; 325 MG/1; MG/1
1 TABLET ORAL ONCE
Status: COMPLETED | OUTPATIENT
Start: 2019-07-15 | End: 2019-07-15

## 2019-07-15 RX ADMIN — Medication 6 MILLICURIE: at 11:50

## 2019-07-15 RX ADMIN — HYDROCODONE BITARTRATE AND ACETAMINOPHEN 1 TABLET: 5; 325 TABLET ORAL at 13:07

## 2019-07-15 RX ADMIN — IOVERSOL 200 ML: 741 INJECTION INTRA-ARTERIAL; INTRAVENOUS at 11:56

## 2019-07-15 RX ADMIN — SODIUM CHLORIDE: 900 INJECTION, SOLUTION INTRAVENOUS at 08:30

## 2019-07-15 ASSESSMENT — PAIN SCALES - GENERAL: PAINLEVEL_OUTOF10: 6

## 2019-07-15 NOTE — PROCEDURES
IR Brief Postoperative Note    Annemarie Lovett  YOB: 1945  [id number]    Pre-operative Diagnosis: hepatic carcinoma    Post-operative Diagnosis: Same    Procedure: Y90 mapping    Anesthesia: moderate    Surgeons/Assistants: demetrius    Estimated Blood Loss: Minimal    Complications: none    Specimens: were not obtained    See full procedure dictation to follow      Karissa Garcia MD  7/15/2019

## 2019-08-05 ENCOUNTER — TELEPHONE (OUTPATIENT)
Dept: CARDIAC CATH/INVASIVE PROCEDURES | Age: 74
End: 2019-08-05

## 2019-08-14 ENCOUNTER — PRE-PROCEDURE TELEPHONE (OUTPATIENT)
Dept: CARDIAC CATH/INVASIVE PROCEDURES | Age: 74
End: 2019-08-14

## 2019-08-15 ENCOUNTER — HOSPITAL ENCOUNTER (OUTPATIENT)
Dept: INTERVENTIONAL RADIOLOGY/VASCULAR | Age: 74
Discharge: HOME | End: 2019-08-15
Attending: RADIOLOGY | Admitting: RADIOLOGY
Payer: MEDICARE

## 2019-08-15 VITALS — HEIGHT: 65 IN | TEMPERATURE: 98 F | WEIGHT: 250 LBS | BODY MASS INDEX: 41.65 KG/M2

## 2019-08-15 LAB
A/G RATIO: 0.9 (ref 1.1–2.2)
ALBUMIN SERPL-MCNC: 3.5 G/DL (ref 3.4–5)
ALP BLD-CCNC: 76 U/L (ref 40–129)
ALT SERPL-CCNC: 19 U/L (ref 10–40)
ANION GAP SERPL CALCULATED.3IONS-SCNC: 16 MMOL/L (ref 3–16)
AST SERPL-CCNC: 43 U/L (ref 15–37)
BILIRUB SERPL-MCNC: 0.3 MG/DL (ref 0–1)
BUN BLDV-MCNC: 57 MG/DL (ref 7–20)
CALCIUM SERPL-MCNC: 9.4 MG/DL (ref 8.3–10.6)
CHLORIDE BLD-SCNC: 101 MMOL/L (ref 99–110)
CO2: 23 MMOL/L (ref 21–32)
CREAT SERPL-MCNC: 2.1 MG/DL (ref 0.6–1.2)
GFR AFRICAN AMERICAN: 28
GFR NON-AFRICAN AMERICAN: 23
GLOBULIN: 4.1 G/DL
GLUCOSE BLD-MCNC: 173 MG/DL (ref 70–99)
GLUCOSE BLD-MCNC: 194 MG/DL (ref 70–99)
HCT VFR BLD CALC: 39.8 % (ref 36–48)
HEMOGLOBIN: 12.9 G/DL (ref 12–16)
INR BLD: 1.05 (ref 0.86–1.14)
MCH RBC QN AUTO: 29.9 PG (ref 26–34)
MCHC RBC AUTO-ENTMCNC: 32.4 G/DL (ref 31–36)
MCV RBC AUTO: 92.4 FL (ref 80–100)
PDW BLD-RTO: 15.6 % (ref 12.4–15.4)
PERFORMED ON: ABNORMAL
PLATELET # BLD: 335 K/UL (ref 135–450)
PMV BLD AUTO: 8.2 FL (ref 5–10.5)
POTASSIUM SERPL-SCNC: 4.6 MMOL/L (ref 3.5–5.1)
PROTHROMBIN TIME: 12 SEC (ref 9.8–13)
RBC # BLD: 4.31 M/UL (ref 4–5.2)
SODIUM BLD-SCNC: 140 MMOL/L (ref 136–145)
TOTAL PROTEIN: 7.6 G/DL (ref 6.4–8.2)
WBC # BLD: 11.8 K/UL (ref 4–11)

## 2019-08-15 PROCEDURE — C1760 CLOSURE DEV, VASC: HCPCS

## 2019-08-15 PROCEDURE — 6360000004 HC RX CONTRAST MEDICATION

## 2019-08-15 PROCEDURE — 36247 INS CATH ABD/L-EXT ART 3RD: CPT | Performed by: RADIOLOGY

## 2019-08-15 PROCEDURE — 75774 ARTERY X-RAY EACH VESSEL: CPT | Performed by: RADIOLOGY

## 2019-08-15 PROCEDURE — 99153 MOD SED SAME PHYS/QHP EA: CPT | Performed by: RADIOLOGY

## 2019-08-15 PROCEDURE — C2616 BRACHYTX, NON-STR,YTTRIUM-90: HCPCS | Performed by: RADIOLOGY

## 2019-08-15 PROCEDURE — 75726 ARTERY X-RAYS ABDOMEN: CPT | Performed by: RADIOLOGY

## 2019-08-15 PROCEDURE — 2709999900 HC NON-CHARGEABLE SUPPLY

## 2019-08-15 PROCEDURE — C1769 GUIDE WIRE: HCPCS

## 2019-08-15 PROCEDURE — 85027 COMPLETE CBC AUTOMATED: CPT

## 2019-08-15 PROCEDURE — C1725 CATH, TRANSLUMIN NON-LASER: HCPCS

## 2019-08-15 PROCEDURE — 76377 3D RENDER W/INTRP POSTPROCES: CPT | Performed by: RADIOLOGY

## 2019-08-15 PROCEDURE — 80053 COMPREHEN METABOLIC PANEL: CPT

## 2019-08-15 PROCEDURE — C1894 INTRO/SHEATH, NON-LASER: HCPCS

## 2019-08-15 PROCEDURE — 99152 MOD SED SAME PHYS/QHP 5/>YRS: CPT | Performed by: RADIOLOGY

## 2019-08-15 PROCEDURE — 79445 NUCLEAR RX INTRA-ARTERIAL: CPT | Performed by: RADIOLOGY

## 2019-08-15 PROCEDURE — 3430000000 HC RX DIAGNOSTIC RADIOPHARMACEUTICAL: Performed by: RADIOLOGY

## 2019-08-15 PROCEDURE — 37243 VASC EMBOLIZE/OCCLUDE ORGAN: CPT | Performed by: RADIOLOGY

## 2019-08-15 PROCEDURE — 85610 PROTHROMBIN TIME: CPT

## 2019-08-15 RX ORDER — OMEPRAZOLE 40 MG/1
40 CAPSULE, DELAYED RELEASE ORAL DAILY
Qty: 30 CAPSULE | Refills: 3 | Status: SHIPPED | OUTPATIENT
Start: 2019-08-15 | End: 2019-08-15 | Stop reason: SDUPTHER

## 2019-08-15 RX ORDER — ONDANSETRON 4 MG/1
4 TABLET, FILM COATED ORAL 3 TIMES DAILY PRN
Qty: 30 TABLET | Refills: 0 | Status: SHIPPED | OUTPATIENT
Start: 2019-08-15

## 2019-08-15 RX ORDER — ONDANSETRON 4 MG/1
4 TABLET, FILM COATED ORAL 3 TIMES DAILY PRN
Qty: 30 TABLET | Refills: 0 | Status: SHIPPED | OUTPATIENT
Start: 2019-08-15 | End: 2019-08-15 | Stop reason: SDUPTHER

## 2019-08-15 RX ORDER — PREDNISONE 10 MG/1
5 TABLET ORAL 2 TIMES DAILY
Qty: 5 TABLET | Refills: 0 | Status: SHIPPED | OUTPATIENT
Start: 2019-08-15 | End: 2019-08-20

## 2019-08-15 RX ORDER — OMEPRAZOLE 40 MG/1
40 CAPSULE, DELAYED RELEASE ORAL DAILY
Qty: 30 CAPSULE | Refills: 0 | Status: SHIPPED | OUTPATIENT
Start: 2019-08-15

## 2019-08-15 RX ADMIN — Medication 4.7 GBQ: at 09:10

## 2019-08-15 ASSESSMENT — PAIN DESCRIPTION - LOCATION: LOCATION: ABDOMEN;BACK

## 2019-08-15 ASSESSMENT — PAIN SCALES - GENERAL: PAINLEVEL_OUTOF10: 4

## 2019-08-15 NOTE — H&P
tablet Take 2 tablets by mouth daily 60 tablet 5    lisinopril (PRINIVIL;ZESTRIL) 2.5 MG tablet 2.5 mg      insulin lispro (HUMALOG) 100 UNIT/ML injection vial Inject 25 Units into the skin      TRESIBA FLEXTOUCH 200 UNIT/ML SOPN       Multiple Vitamins-Minerals (COMPLETE WOMENS) TABS Take 1 tablet by mouth      vitamin D (CHOLECALCIFEROL) 1000 UNIT TABS tablet Take 1,000 Units by mouth         Current Meds    iopamidol (ISOVUE-370) 76 % injection 150 mL ONCE PRN   yttrium 90 (SirSpheres) injection 13 GBq ONCE PRN         ASA 2 - Patient with mild systemic disease with no functional limitations    II (soft palate, uvula, fauces visible)    Activity:  2 - Able to move 4 extremities voluntarily on command  Respiration:  2 - Able to breathe deeply and cough freely  Circulation:  2 - BP+/- 20mmHg of normal  Consciousness:  2 - Fully awake  Oxygen Saturation (color):  2 - Able to maintain oxygen saturation >92% on room air    Sedation : Moderate sedation planned

## 2019-08-15 NOTE — PROCEDURES
IR Brief Postoperative Note    Shira Salmeron  YOB: 1945  [id number]    Pre-operative Diagnosis: HCC    Post-operative Diagnosis: Same    Procedure: Y90 radioembolization, right lobe    Anesthesia: moderate    Surgeons/Assistants: demetrius    Estimated Blood Loss: Minimal    Complications: none    Specimens: were not obtained    See full procedure dictation to follow      Jon Sims MD  8/15/2019

## 2019-08-21 ENCOUNTER — TELEPHONE (OUTPATIENT)
Dept: CARDIAC CATH/INVASIVE PROCEDURES | Age: 74
End: 2019-08-21

## 2019-09-20 ENCOUNTER — TELEPHONE (OUTPATIENT)
Dept: CARDIAC CATH/INVASIVE PROCEDURES | Age: 74
End: 2019-09-20

## 2019-09-20 DIAGNOSIS — C22.9 HEPATIC ADENOCARCINOMA (HCC): Primary | ICD-10-CM

## 2019-09-27 ENCOUNTER — OFFICE VISIT (OUTPATIENT)
Dept: CARDIOLOGY CLINIC | Age: 74
End: 2019-09-27
Payer: MEDICARE

## 2019-09-27 VITALS
SYSTOLIC BLOOD PRESSURE: 110 MMHG | HEART RATE: 72 BPM | DIASTOLIC BLOOD PRESSURE: 76 MMHG | BODY MASS INDEX: 41.3 KG/M2 | WEIGHT: 248.2 LBS

## 2019-09-27 DIAGNOSIS — I50.32 CHRONIC HEART FAILURE WITH PRESERVED EJECTION FRACTION (HCC): Primary | ICD-10-CM

## 2019-09-27 DIAGNOSIS — I10 ESSENTIAL HYPERTENSION: ICD-10-CM

## 2019-09-27 DIAGNOSIS — E78.2 MIXED HYPERLIPIDEMIA: ICD-10-CM

## 2019-09-27 PROCEDURE — G8427 DOCREV CUR MEDS BY ELIG CLIN: HCPCS | Performed by: INTERNAL MEDICINE

## 2019-09-27 PROCEDURE — 1123F ACP DISCUSS/DSCN MKR DOCD: CPT | Performed by: INTERNAL MEDICINE

## 2019-09-27 PROCEDURE — 1036F TOBACCO NON-USER: CPT | Performed by: INTERNAL MEDICINE

## 2019-09-27 PROCEDURE — 99214 OFFICE O/P EST MOD 30 MIN: CPT | Performed by: INTERNAL MEDICINE

## 2019-09-27 PROCEDURE — 4040F PNEUMOC VAC/ADMIN/RCVD: CPT | Performed by: INTERNAL MEDICINE

## 2019-09-27 PROCEDURE — G8417 CALC BMI ABV UP PARAM F/U: HCPCS | Performed by: INTERNAL MEDICINE

## 2019-09-27 PROCEDURE — 3017F COLORECTAL CA SCREEN DOC REV: CPT | Performed by: INTERNAL MEDICINE

## 2019-09-27 PROCEDURE — 1090F PRES/ABSN URINE INCON ASSESS: CPT | Performed by: INTERNAL MEDICINE

## 2019-09-27 PROCEDURE — G8400 PT W/DXA NO RESULTS DOC: HCPCS | Performed by: INTERNAL MEDICINE

## 2019-09-27 NOTE — PROGRESS NOTES
Abdullahi Linn MD   HYDROcodone-acetaminophen [facility]) 5-325 MG per tablet Take 1 tablet by mouth every 6 hours as needed for Pain. Yes Historical Provider, MD   atorvastatin (LIPITOR) 40 MG tablet Take 1 tablet by mouth daily 6/21/19  Yes Alina Joel MD   bisoprolol (ZEBETA) 5 MG tablet Take 1 tablet by mouth daily 6/21/19  Yes Alina Joel MD   prazosin (MINIPRESS) 2 MG capsule Take 1 capsule by mouth nightly 6/20/19  Yes Maxine Dickens MD   furosemide (LASIX) 40 MG tablet Take 2 tablets by mouth daily 6/20/19  Yes Maxine Dickens MD   lisinopril (PRINIVIL;ZESTRIL) 2.5 MG tablet 2.5 mg   Yes Historical Provider, MD   insulin lispro (HUMALOG) 100 UNIT/ML injection vial Inject 25 Units into the skin   Yes Historical Provider, MD Jeremias Damico 200 UNIT/ML SOPN  4/13/19  Yes Historical Provider, MD   Multiple Vitamins-Minerals (COMPLETE WOMENS) TABS Take 1 tablet by mouth   Yes Historical Provider, MD   vitamin D (CHOLECALCIFEROL) 1000 UNIT TABS tablet Take 1,000 Units by mouth   Yes Historical Provider, MD          Allergy:     Oxycodone-acetaminophen       Review of Systems:     All 12 point review of symptoms completed. Pertinent positives identified in the HPI, all other review of symptoms negative as below. CONSTITUTIONAL: No fatigue  SKIN: No rash or pruritis. EYES: No visual changes or diplopia. No scleral icterus. ENT: No Headaches, hearing loss or vertigo. No mouth sores or sore throat. CARDIOVASCULAR: No chest pain/chest pressure/chest discomfort. No palpitations. No edema. RESPIRATORY: No dyspnea. No cough or wheezing, no sputum production. GASTROINTESTINAL: No N/V/D. No abdominal pain, appetite loss, blood in stools. GENITOURINARY: No dysuria, trouble voiding, or hematuria. MUSCULOSKELETAL:  No gait disturbance, weakness or joint complaints. NEUROLOGICAL: No headache, diplopia, change in muscle strength, numbness or tingling.  No change in gait, balance, coordination, mood, CO2 23 08/15/2019    BUN 57 (H) 08/15/2019    CREATININE 2.1 (H) 08/15/2019    GLUCOSE 194 (H) 08/15/2019    CALCIUM 9.4 08/15/2019    PROT 7.6 08/15/2019    LABALBU 3.5 08/15/2019    BILITOT 0.3 08/15/2019    ALKPHOS 76 08/15/2019    AST 43 (H) 08/15/2019    ALT 19 08/15/2019    LABGLOM 23 (A) 08/15/2019    GFRAA 28 (A) 08/15/2019    AGRATIO 0.9 (L) 08/15/2019    GLOB 4.1 08/15/2019         Lab Results   Component Value Date    CHOL 204 (H) 06/15/2017     Lab Results   Component Value Date    TRIG 243 (H) 06/15/2017     Lab Results   Component Value Date    HDL 46 04/22/2019    HDL 44 02/28/2019    HDL 65 (H) 11/16/2017     Lab Results   Component Value Date    LDLCALC 47 04/22/2019    LDLCALC see below 02/28/2019    LDLCALC 118 (H) 11/16/2017     Lab Results   Component Value Date    LABVLDL 39 04/22/2019    LABVLDL see below 02/28/2019    LABVLDL 41 11/16/2017     No results found for: [facility]    Lab Results   Component Value Date    INR 1.05 08/15/2019    INR 1.03 07/15/2019    INR 1.02 07/08/2019    PROTIME 12.0 08/15/2019    PROTIME 11.7 07/15/2019    PROTIME 11.6 07/08/2019       The 10-year ASCVD risk score (Emilio Christensen, et al., 2013) is: 13.7%    Values used to calculate the score:      Age: 76 years      Sex: Female      Is Non- : No      Diabetic: No      Tobacco smoker: No      Systolic Blood Pressure: 424 mmHg      Is BP treated: Yes      HDL Cholesterol: 46 mg/dL      Total Cholesterol: 132 mg/dL      Assessment / Plan:      Diagnosis Orders   1. Chronic heart failure with preserved ejection fraction ([address]     2. Essential hypertension     3. Mixed hyperlipidemia          2. Chronic HFpEF:   Patient is euvolemic and hemo stable. She follows with Dr. Marc Moraes who adjusts her diuretics. -C/w lasix 80 daily per Renal.      3. HTN:   BP is controlled on current meds.      4. HLP:   On statin. Return in about 6 months (around 3/27/2020).       I have spent 25 minutes of

## 2019-10-03 ENCOUNTER — HOSPITAL ENCOUNTER (OUTPATIENT)
Dept: CT IMAGING | Age: 74
Discharge: HOME | End: 2019-10-03
Payer: MEDICARE

## 2019-10-03 DIAGNOSIS — C22.9 HEPATIC ADENOCARCINOMA (HCC): ICD-10-CM

## 2019-10-03 DIAGNOSIS — G89.3 NEOPLASM RELATED PAIN: ICD-10-CM

## 2019-10-03 PROCEDURE — 74150 CT ABDOMEN W/O CONTRAST: CPT
